# Patient Record
Sex: MALE | Race: WHITE
[De-identification: names, ages, dates, MRNs, and addresses within clinical notes are randomized per-mention and may not be internally consistent; named-entity substitution may affect disease eponyms.]

---

## 2018-02-14 ENCOUNTER — HOSPITAL ENCOUNTER (EMERGENCY)
Dept: HOSPITAL 35 - ER | Age: 38
Discharge: HOME | End: 2018-02-14
Payer: COMMERCIAL

## 2018-02-14 VITALS — WEIGHT: 205.01 LBS | HEIGHT: 67 IN | BODY MASS INDEX: 32.18 KG/M2

## 2018-02-14 DIAGNOSIS — I10: ICD-10-CM

## 2018-02-14 DIAGNOSIS — E11.9: ICD-10-CM

## 2018-02-14 DIAGNOSIS — Z88.6: ICD-10-CM

## 2018-02-14 DIAGNOSIS — R07.9: ICD-10-CM

## 2018-02-14 DIAGNOSIS — Z88.0: ICD-10-CM

## 2018-02-14 DIAGNOSIS — R51: Primary | ICD-10-CM

## 2018-02-14 LAB
ALBUMIN SERPL-MCNC: 3.6 G/DL (ref 3.4–5)
ALT SERPL-CCNC: 20 U/L (ref 30–65)
ANION GAP SERPL CALC-SCNC: 9 MMOL/L (ref 7–16)
AST SERPL-CCNC: 16 U/L (ref 15–37)
BACTERIA #/AREA URNS HPF: (no result) /HPF
BASOPHILS NFR BLD AUTO: 1.2 % (ref 0–2)
BILIRUB SERPL-MCNC: 0.3 MG/DL
BILIRUB UR-MCNC: NEGATIVE MG/DL
BUN SERPL-MCNC: 14 MG/DL (ref 7–18)
CALCIUM SERPL-MCNC: 9.2 MG/DL (ref 8.5–10.1)
CHLORIDE SERPL-SCNC: 104 MMOL/L (ref 98–107)
CO2 SERPL-SCNC: 29 MMOL/L (ref 21–32)
COLOR UR: YELLOW
CREAT SERPL-MCNC: 1 MG/DL (ref 0.7–1.3)
EOSINOPHIL NFR BLD: 1.7 % (ref 0–3)
ERYTHROCYTE [DISTWIDTH] IN BLOOD BY AUTOMATED COUNT: 15.9 % (ref 10.5–14.5)
GLUCOSE SERPL-MCNC: 205 MG/DL (ref 74–106)
GRANULOCYTES NFR BLD MANUAL: 67.6 % (ref 36–66)
HCT VFR BLD CALC: 41.8 % (ref 42–52)
HGB BLD-MCNC: 13.8 GM/DL (ref 14–18)
KETONES UR STRIP-MCNC: NEGATIVE MG/DL
LYMPHOCYTES NFR BLD AUTO: 21.8 % (ref 24–44)
MCH RBC QN AUTO: 25 PG (ref 26–34)
MCHC RBC AUTO-ENTMCNC: 33.1 G/DL (ref 28–37)
MCV RBC: 75.6 FL (ref 80–100)
MONOCYTES NFR BLD: 7.7 % (ref 1–8)
MUCUS: (no result) STRN/LPF
NEUTROPHILS # BLD: 8.3 THOU/UL (ref 1.4–8.2)
NITRITE UR QL STRIP: NEGATIVE
PLATELET # BLD: 250 THOU/UL (ref 150–400)
POTASSIUM SERPL-SCNC: 4.1 MMOL/L (ref 3.5–5.1)
PROT SERPL-MCNC: 6.9 G/DL (ref 6.4–8.2)
RBC # BLD AUTO: 5.52 MIL/UL (ref 4.5–6)
RBC # UR STRIP: (no result) /UL
RBC #/AREA URNS HPF: (no result) /HPF (ref 0–2)
SODIUM SERPL-SCNC: 142 MMOL/L (ref 136–145)
SP GR UR STRIP: 1.02 (ref 1–1.03)
SQUAMOUS: (no result) /LPF (ref 0–3)
TROPONIN I SERPL-MCNC: < 0.04 NG/ML (ref ?–0.06)
URINE CLARITY: CLEAR
URINE GLUCOSE-RANDOM*: (no result)
URINE LEUKOCYTES: NEGATIVE
URINE PROTEIN (DIPSTICK): (no result)
UROBILINOGEN UR STRIP-ACNC: 0.2 E.U./DL (ref 0.2–1)
WBC # BLD AUTO: 12.3 THOU/UL (ref 4–11)
WBC #/AREA URNS HPF: (no result) /HPF (ref 0–5)

## 2019-07-08 ENCOUNTER — HOSPITAL ENCOUNTER (EMERGENCY)
Dept: HOSPITAL 35 - ER | Age: 39
LOS: 1 days | Discharge: HOME | End: 2019-07-09
Payer: COMMERCIAL

## 2019-07-08 VITALS — WEIGHT: 205.01 LBS | BODY MASS INDEX: 30.36 KG/M2 | HEIGHT: 69 IN

## 2019-07-08 DIAGNOSIS — R53.1: Primary | ICD-10-CM

## 2019-07-08 DIAGNOSIS — I10: ICD-10-CM

## 2019-07-08 DIAGNOSIS — F17.210: ICD-10-CM

## 2019-07-08 DIAGNOSIS — R21: ICD-10-CM

## 2019-07-08 DIAGNOSIS — E11.9: ICD-10-CM

## 2019-07-08 DIAGNOSIS — Z88.0: ICD-10-CM

## 2019-07-08 DIAGNOSIS — Z88.6: ICD-10-CM

## 2019-07-08 LAB
ALBUMIN SERPL-MCNC: 3.4 G/DL (ref 3.4–5)
ALT SERPL-CCNC: 18 U/L (ref 30–65)
ANION GAP SERPL CALC-SCNC: 11 MMOL/L (ref 7–16)
AST SERPL-CCNC: 12 U/L (ref 15–37)
BASOPHILS NFR BLD AUTO: 1.2 % (ref 0–2)
BILIRUB SERPL-MCNC: 0.2 MG/DL
BUN SERPL-MCNC: 19 MG/DL (ref 7–18)
CALCIUM SERPL-MCNC: 8.4 MG/DL (ref 8.5–10.1)
CHLORIDE SERPL-SCNC: 103 MMOL/L (ref 98–107)
CO2 SERPL-SCNC: 29 MMOL/L (ref 21–32)
CREAT SERPL-MCNC: 1.7 MG/DL (ref 0.7–1.3)
EOSINOPHIL NFR BLD: 2 % (ref 0–3)
ERYTHROCYTE [DISTWIDTH] IN BLOOD BY AUTOMATED COUNT: 16.6 % (ref 10.5–14.5)
GLUCOSE SERPL-MCNC: 182 MG/DL (ref 74–106)
GRANULOCYTES NFR BLD MANUAL: 60.6 % (ref 36–66)
HCT VFR BLD CALC: 36.3 % (ref 42–52)
HGB BLD-MCNC: 11.8 GM/DL (ref 14–18)
LYMPHOCYTES NFR BLD AUTO: 28.3 % (ref 24–44)
MAGNESIUM SERPL-MCNC: 1.9 MG/DL (ref 1.8–2.4)
MCH RBC QN AUTO: 25 PG (ref 26–34)
MCHC RBC AUTO-ENTMCNC: 32.6 G/DL (ref 28–37)
MCV RBC: 76.7 FL (ref 80–100)
MONOCYTES NFR BLD: 7.9 % (ref 1–8)
NEUTROPHILS # BLD: 6.9 THOU/UL (ref 1.4–8.2)
PLATELET # BLD: 218 THOU/UL (ref 150–400)
POTASSIUM SERPL-SCNC: 3.9 MMOL/L (ref 3.5–5.1)
PROT SERPL-MCNC: 6.8 G/DL (ref 6.4–8.2)
RBC # BLD AUTO: 4.74 MIL/UL (ref 4.5–6)
SODIUM SERPL-SCNC: 143 MMOL/L (ref 136–145)
TROPONIN I SERPL-MCNC: <0.06 NG/ML (ref ?–0.06)
WBC # BLD AUTO: 11.4 THOU/UL (ref 4–11)

## 2019-07-09 VITALS — DIASTOLIC BLOOD PRESSURE: 85 MMHG | SYSTOLIC BLOOD PRESSURE: 122 MMHG

## 2019-07-09 LAB
BILIRUB UR-MCNC: NEGATIVE MG/DL
COLOR UR: YELLOW
KETONES UR STRIP-MCNC: NEGATIVE MG/DL
RBC # UR STRIP: (no result) /UL
SP GR UR STRIP: <= 1.005 (ref 1–1.03)
URINE CLARITY: CLEAR
URINE GLUCOSE-RANDOM*: (no result)
URINE LEUKOCYTES-REFLEX: NEGATIVE
URINE NITRITE-REFLEX: NEGATIVE
URINE PROTEIN (DIPSTICK): (no result)
UROBILINOGEN UR STRIP-ACNC: 0.2 E.U./DL (ref 0.2–1)

## 2019-12-30 ENCOUNTER — HOSPITAL ENCOUNTER (EMERGENCY)
Dept: HOSPITAL 35 - ER | Age: 39
Discharge: HOME | End: 2019-12-30
Payer: COMMERCIAL

## 2019-12-30 VITALS — DIASTOLIC BLOOD PRESSURE: 84 MMHG | SYSTOLIC BLOOD PRESSURE: 126 MMHG

## 2019-12-30 VITALS — BODY MASS INDEX: 31.39 KG/M2 | HEIGHT: 67 IN | WEIGHT: 200 LBS

## 2019-12-30 DIAGNOSIS — Z88.6: ICD-10-CM

## 2019-12-30 DIAGNOSIS — R53.1: ICD-10-CM

## 2019-12-30 DIAGNOSIS — I10: ICD-10-CM

## 2019-12-30 DIAGNOSIS — F17.210: ICD-10-CM

## 2019-12-30 DIAGNOSIS — Z88.0: ICD-10-CM

## 2019-12-30 DIAGNOSIS — E11.9: ICD-10-CM

## 2019-12-30 DIAGNOSIS — R11.0: Primary | ICD-10-CM

## 2019-12-30 LAB
ALBUMIN SERPL-MCNC: 3.9 G/DL (ref 3.4–5)
ALT SERPL-CCNC: 30 U/L (ref 30–65)
AMYLASE SERPL-CCNC: 93 U/L (ref 25–115)
ANION GAP SERPL CALC-SCNC: 8 MMOL/L (ref 7–16)
AST SERPL-CCNC: 20 U/L (ref 15–37)
BACTERIA-REFLEX: (no result) /HPF
BASOPHILS NFR BLD AUTO: 1.3 % (ref 0–2)
BILIRUB DIRECT SERPL-MCNC: < 0.1 MG/DL
BILIRUB SERPL-MCNC: 0.3 MG/DL
BILIRUB UR-MCNC: NEGATIVE MG/DL
BUN SERPL-MCNC: 20 MG/DL (ref 7–18)
CALCIUM SERPL-MCNC: 8.7 MG/DL (ref 8.5–10.1)
CHLORIDE SERPL-SCNC: 107 MMOL/L (ref 98–107)
CO2 SERPL-SCNC: 28 MMOL/L (ref 21–32)
COLOR UR: YELLOW
CREAT SERPL-MCNC: 1.6 MG/DL (ref 0.7–1.3)
EOSINOPHIL NFR BLD: 1.9 % (ref 0–3)
ERYTHROCYTE [DISTWIDTH] IN BLOOD BY AUTOMATED COUNT: 16.6 % (ref 10.5–14.5)
GLUCOSE SERPL-MCNC: 137 MG/DL (ref 74–106)
GRANULOCYTES NFR BLD MANUAL: 69.4 % (ref 36–66)
HCT VFR BLD CALC: 38.2 % (ref 42–52)
HGB BLD-MCNC: 12.2 GM/DL (ref 14–18)
KETONES UR STRIP-MCNC: NEGATIVE MG/DL
LIPASE: 174 U/L (ref 73–393)
LYMPHOCYTES NFR BLD AUTO: 19.6 % (ref 24–44)
MCH RBC QN AUTO: 25.3 PG (ref 26–34)
MCHC RBC AUTO-ENTMCNC: 32 G/DL (ref 28–37)
MCV RBC: 79.1 FL (ref 80–100)
MONOCYTES NFR BLD: 7.8 % (ref 1–8)
NEUTROPHILS # BLD: 9.4 THOU/UL (ref 1.4–8.2)
PLATELET # BLD: 219 THOU/UL (ref 150–400)
POTASSIUM SERPL-SCNC: 4.1 MMOL/L (ref 3.5–5.1)
PROT SERPL-MCNC: 7.1 G/DL (ref 6.4–8.2)
RBC # BLD AUTO: 4.82 MIL/UL (ref 4.5–6)
RBC # UR STRIP: (no result) /UL
SODIUM SERPL-SCNC: 143 MMOL/L (ref 136–145)
SP GR UR STRIP: 1.02 (ref 1–1.03)
SQUAMOUS: (no result) /LPF (ref 0–3)
URINE CLARITY: CLEAR
URINE GLUCOSE-RANDOM*: (no result)
URINE LEUKOCYTES-REFLEX: NEGATIVE
URINE NITRITE-REFLEX: NEGATIVE
URINE PROTEIN (DIPSTICK): (no result)
UROBILINOGEN UR STRIP-ACNC: 0.2 E.U./DL (ref 0.2–1)
WBC # BLD AUTO: 13.5 THOU/UL (ref 4–11)

## 2020-06-03 ENCOUNTER — HOSPITAL ENCOUNTER (INPATIENT)
Dept: HOSPITAL 35 - ER | Age: 40
LOS: 1 days | Discharge: HOME | DRG: 313 | End: 2020-06-04
Attending: HOSPITALIST | Admitting: HOSPITALIST
Payer: COMMERCIAL

## 2020-06-03 VITALS — SYSTOLIC BLOOD PRESSURE: 129 MMHG | DIASTOLIC BLOOD PRESSURE: 78 MMHG

## 2020-06-03 VITALS — HEIGHT: 62.01 IN | BODY MASS INDEX: 39.97 KG/M2 | WEIGHT: 220 LBS

## 2020-06-03 VITALS — SYSTOLIC BLOOD PRESSURE: 107 MMHG | DIASTOLIC BLOOD PRESSURE: 70 MMHG

## 2020-06-03 VITALS — DIASTOLIC BLOOD PRESSURE: 83 MMHG | SYSTOLIC BLOOD PRESSURE: 119 MMHG

## 2020-06-03 VITALS — SYSTOLIC BLOOD PRESSURE: 103 MMHG | DIASTOLIC BLOOD PRESSURE: 55 MMHG

## 2020-06-03 VITALS — DIASTOLIC BLOOD PRESSURE: 90 MMHG | SYSTOLIC BLOOD PRESSURE: 132 MMHG

## 2020-06-03 DIAGNOSIS — E11.22: ICD-10-CM

## 2020-06-03 DIAGNOSIS — E78.5: ICD-10-CM

## 2020-06-03 DIAGNOSIS — Z79.899: ICD-10-CM

## 2020-06-03 DIAGNOSIS — Z88.6: ICD-10-CM

## 2020-06-03 DIAGNOSIS — N18.3: ICD-10-CM

## 2020-06-03 DIAGNOSIS — F12.90: ICD-10-CM

## 2020-06-03 DIAGNOSIS — R07.9: Primary | ICD-10-CM

## 2020-06-03 DIAGNOSIS — Z98.41: ICD-10-CM

## 2020-06-03 DIAGNOSIS — Z79.82: ICD-10-CM

## 2020-06-03 DIAGNOSIS — H54.62: ICD-10-CM

## 2020-06-03 DIAGNOSIS — Z88.0: ICD-10-CM

## 2020-06-03 DIAGNOSIS — I12.9: ICD-10-CM

## 2020-06-03 DIAGNOSIS — E11.42: ICD-10-CM

## 2020-06-03 DIAGNOSIS — F17.210: ICD-10-CM

## 2020-06-03 DIAGNOSIS — Z86.73: ICD-10-CM

## 2020-06-03 DIAGNOSIS — Z71.6: ICD-10-CM

## 2020-06-03 LAB
ALBUMIN SERPL-MCNC: 3.6 G/DL (ref 3.4–5)
ALT SERPL-CCNC: 26 U/L (ref 30–65)
ANION GAP SERPL CALC-SCNC: 7 MMOL/L (ref 7–16)
AST SERPL-CCNC: 19 U/L (ref 15–37)
BASOPHILS NFR BLD AUTO: 0.1 % (ref 0–2)
BILIRUB SERPL-MCNC: 0.3 MG/DL (ref 0.2–1)
BUN SERPL-MCNC: 22 MG/DL (ref 7–18)
CALCIUM SERPL-MCNC: 8.1 MG/DL (ref 8.5–10.1)
CHLORIDE SERPL-SCNC: 105 MMOL/L (ref 98–107)
CHOLEST SERPL-MCNC: 95 MG/DL (ref ?–200)
CO2 SERPL-SCNC: 29 MMOL/L (ref 21–32)
CREAT SERPL-MCNC: 1.8 MG/DL (ref 0.7–1.3)
EOSINOPHIL NFR BLD: 2 % (ref 0–3)
ERYTHROCYTE [DISTWIDTH] IN BLOOD BY AUTOMATED COUNT: 16.8 % (ref 10.5–14.5)
GLUCOSE SERPL-MCNC: 143 MG/DL (ref 74–106)
GRANULOCYTES NFR BLD MANUAL: 61.8 % (ref 36–66)
HCT VFR BLD CALC: 36.9 % (ref 42–52)
HDLC SERPL-MCNC: 27 MG/DL (ref 40–?)
HGB BLD-MCNC: 12 GM/DL (ref 14–18)
LDLC SERPL-MCNC: 38 MG/DL (ref ?–100)
LYMPHOCYTES NFR BLD AUTO: 28.2 % (ref 24–44)
MCH RBC QN AUTO: 25.2 PG (ref 26–34)
MCHC RBC AUTO-ENTMCNC: 32.4 G/DL (ref 28–37)
MCV RBC: 77.7 FL (ref 80–100)
MONOCYTES NFR BLD: 7.9 % (ref 1–8)
NEUTROPHILS # BLD: 6.9 THOU/UL (ref 1.4–8.2)
PLATELET # BLD: 210 THOU/UL (ref 150–400)
POTASSIUM SERPL-SCNC: 3.8 MMOL/L (ref 3.5–5.1)
PROT SERPL-MCNC: 6.8 G/DL (ref 6.4–8.2)
RBC # BLD AUTO: 4.75 MIL/UL (ref 4.5–6)
SODIUM SERPL-SCNC: 141 MMOL/L (ref 136–145)
TC:HDL: 3.5 RATIO
TRIGL SERPL-MCNC: 152 MG/DL (ref ?–150)
TROPONIN I SERPL-MCNC: <0.06 NG/ML (ref ?–0.06)
VLDLC SERPL CALC-MCNC: 30 MG/DL (ref ?–40)
WBC # BLD AUTO: 11.2 THOU/UL (ref 4–11)

## 2020-06-03 PROCEDURE — 10081 I&D PILONIDAL CYST COMP: CPT

## 2020-06-04 VITALS — SYSTOLIC BLOOD PRESSURE: 103 MMHG | DIASTOLIC BLOOD PRESSURE: 80 MMHG

## 2020-06-04 VITALS — DIASTOLIC BLOOD PRESSURE: 79 MMHG | SYSTOLIC BLOOD PRESSURE: 138 MMHG

## 2020-06-04 VITALS — DIASTOLIC BLOOD PRESSURE: 72 MMHG | SYSTOLIC BLOOD PRESSURE: 115 MMHG

## 2020-06-04 VITALS — SYSTOLIC BLOOD PRESSURE: 121 MMHG | DIASTOLIC BLOOD PRESSURE: 73 MMHG

## 2020-06-04 VITALS — DIASTOLIC BLOOD PRESSURE: 73 MMHG | SYSTOLIC BLOOD PRESSURE: 121 MMHG

## 2020-06-04 VITALS — DIASTOLIC BLOOD PRESSURE: 97 MMHG | SYSTOLIC BLOOD PRESSURE: 140 MMHG

## 2020-06-04 VITALS — DIASTOLIC BLOOD PRESSURE: 74 MMHG | SYSTOLIC BLOOD PRESSURE: 114 MMHG

## 2020-06-04 LAB
ANION GAP SERPL CALC-SCNC: 9 MMOL/L (ref 7–16)
BUN SERPL-MCNC: 20 MG/DL (ref 7–18)
CALCIUM SERPL-MCNC: 7.8 MG/DL (ref 8.5–10.1)
CHLORIDE SERPL-SCNC: 107 MMOL/L (ref 98–107)
CO2 SERPL-SCNC: 27 MMOL/L (ref 21–32)
CREAT SERPL-MCNC: 1.5 MG/DL (ref 0.7–1.3)
ERYTHROCYTE [DISTWIDTH] IN BLOOD BY AUTOMATED COUNT: 16.9 % (ref 10.5–14.5)
GLUCOSE SERPL-MCNC: 128 MG/DL (ref 74–106)
HCT VFR BLD CALC: 35.2 % (ref 42–52)
HGB BLD-MCNC: 11.7 GM/DL (ref 14–18)
MCH RBC QN AUTO: 25.7 PG (ref 26–34)
MCHC RBC AUTO-ENTMCNC: 33.3 G/DL (ref 28–37)
MCV RBC: 77.1 FL (ref 80–100)
PLATELET # BLD: 196 THOU/UL (ref 150–400)
POTASSIUM SERPL-SCNC: 4 MMOL/L (ref 3.5–5.1)
RBC # BLD AUTO: 4.56 MIL/UL (ref 4.5–6)
SODIUM SERPL-SCNC: 143 MMOL/L (ref 136–145)
WBC # BLD AUTO: 11.2 THOU/UL (ref 4–11)

## 2020-06-04 NOTE — EKG
[facility]
[address]
[place]   [address]                     ELECTROCARDIOGRAM REPORT      
_______________________________________________________________________________
 
Name:       MINESH TALBOT               Room #:         200-I       ADM IN  
M.R.#:      [id number]                       Account #:      [id number]  
Admission:  20    Attend Phys:    Eben Georges MD     
Discharge:              Date of Birth:  [birth year]  
                                                          Report #: [id number]
                                                                    [id number]
_______________________________________________________________________________
THIS REPORT FOR:  
 
cc:  Andrew Mora Brady DO Lundgren,Douglas CORMIER MD [facility]                                        ~
THIS REPORT FOR:   //name//                          
 
                          [facility]
                                       
Test Date:    2020               Test Time:    07:09:13
Pat Name:     MINESH TALBOT            Department:   
Patient ID:   [id number]            Room:         200 I
Gender:       M                        Technician:   MURRAY
:          1980               Requested By: Ivelisse Nowak
Order Number: [id number] MD:   Douglas Liao
                                 Measurements
Intervals                              Axis          
Rate:         78                       P:            50
FL:           156                      QRS:          38
QRSD:         98                       T:            24
QT:           361                                    
QTc:          412                                    
                           Interpretive Statements
Sinus rhythm
Normal tracing
Compared to ECG 2019 21:19:08
No significant changes
 
Electronically Signed On 2020 8:46:38 CDT by Douglas Liao
[email or web address]
 
 
 
 
 
 
 
 
 
 
 
 
 
 
  <ELECTRONICALLY SIGNED>
   By: Douglas Liao MD, [facility]   
  20     0846
D: 20                           _____________________________________
T: 20                           Douglas Liao MD, [facility]     /EPI

## 2020-06-04 NOTE — 2DMMODE
[facility]
[place]   [address]                   2 D/M-MODE ECHOCARDIOGRAM     
_______________________________________________________________________________
 
Name:       MINESH TALBOT               Room #:         200-I       ADM IN  
M.R.#:      [id number]                       Account #:      [id number]  
Admission:  20    Attend Phys:    Eben Georges MD     
Discharge:              Date of Birth:  [birth year]  
                                                          Report #: [id number]
                                                                    [id number]
_______________________________________________________________________________
THIS REPORT FOR:  
 
cc:  Andrew Mora Brady DO Park,Oliverio MARTINEZ MD                                                   
                                                                       ~
 
--------------- APPROVED REPORT --------------
 
 
Study performed:  2020 13:45:49
 
EXAM: Comprehensive 2D, Doppler, and color-flow 
Echocardiogram 
Patient Location: Echo lab   
Room #:  200     Status:  routine
 
      BSA:         1.99
HR: 74 bpm BP:          138/79 mmHg 
Rhythm: NSR     
 
Other Information 
Study Quality: Good
 
Indications
Diabetes
Chest Pain
Hypertension/HDD
 
2D Dimensions
RVDd:  35.20 mm  
IVSd:  10.39 (7-11mm) LVOT Diam:  23.48 (18-24mm) 
LVDd:  47.03 mm  
PWd:  11.43 (7-11mm) Ascending Ao:  29.41 (22-36mm)
LVDs:  29.13 (25-40mm) 
Aortic Root:  33.40 mm IVC:  19.00 mm
 
Volumes
Left Atrial Volume (Systole) 
Single Plane 4CH:  45.65 mL Single Plane 2CH:  39.99 mL
    LA ESV Index:  24.00 mL/m2
 
Aortic Valve
AoV Peak Heriberto.:  1.14 m/s 
AO Peak Gr.:  5.23 mmHg  LVOT Max P.18 mmHg
    LVOT Max V:  1.02 m/s
 
 
[facility]
[address]
[place]  [address]
Phone:  [phone]                    2 D/M-MODE ECHOCARDIOGRAM     
_______________________________________________________________________________
 
Name:            MINESH TALBOT               Room #:        200-I       [facility] [place]
[facility]#:           [id number]          Account #:     [id number]  
Admission:       20         Attend Phys:   Eben Georges MD 
Discharge:                  Date of Birth: [birth year]  
                         Report #:      [id number]
        [id number]
_______________________________________________________________________________
RUDOLPH Vmax: 3.87 cm2  
 
Mitral Valve
    E/A Ratio:  1.9
    MV Decel. Time:  164.84 ms
MV E Max Heriberto.:  1.17 m/s 
MV A Heriberto.:  0.63 m/s  
MV PHT:  47.80 ms  
IVRT:  78.43 ms   
 
Pulmonary Valve
PV Peak Heriberto.:  1.15 m/s PV Peak Gr.:  5.34 mmHg
 
Pulmonary Vein
P Vein S:    0.39 m/s P Vein A:  0.19 m/s
P Vein D:   0.35 m/s P Vein A Dur.:  87.7 msec
P Vein S/D Ratio:  1.11 
 
Tricuspid Valve
TR Peak Heriberto.:  2.25 m/s  
TR Peak Gr.:  20.18 mmHg 
    PA Pressure:  25.00 mmHg
 
Left Ventricle
The left ventricle is normal size. There is normal LV segmental wall 
motion. There is normal left ventricular wall thickness. The left 
ventricular systolic function is normal. The left ventricular 
ejection fraction is within the normal range. LVEF is 55-60%.
 
Right Ventricle
The right ventricle is normal size. The right ventricular systolic 
function is normal.
 
Atria
The left atrium size is normal. The right atrium size is 
normal.
 
Aortic Valve
The aortic valve is normal in structure. No aortic regurgitation is 
present. There is no aortic valvular stenosis.
 
Mitral Valve
The mitral valve is normal in structure. Mild mitral regurgitation. 
No evidence of mitral valve stenosis.
 
Tricuspid Valve
 
 
[facility]
[address]
[place]  [address]
Phone:  [phone]                    2 D/M-MODE ECHOCARDIOGRAM     
_______________________________________________________________________________
 
Name:            MINESH TALBOT               Room #:        200-I       [facility] [place]
[facility]#:           [id number]          Account #:     [id number]  
Admission:       20         Attend Phys:   Eben Georges MD 
Discharge:                  Date of Birth: [birth year]  
                         Report #:      [id number]
        [id number]
_______________________________________________________________________________
The tricuspid valve is normal in structure. There is trace tricuspid 
regurgitation. Estimated PAP 25 mmHg. There is no pulmonary 
hypertension.
 
Pulmonic Valve
The pulmonary valve is normal in structure. There is no pulmonic 
valvular regurgitation.
 
Great Vessels
The aortic root is normal in size. IVC is normal in size and 
collapses >50% with inspiration.
 
Pericardium
There is no pericardial effusion.
 
<Conclusion>
The left ventricle is normal size.
There is normal left ventricular wall thickness.
The left ventricular systolic function is normal.
The right ventricle is normal size.
The left atrium size is normal.
The aortic valve is normal in structure.
Mild mitral regurgitation.
There is trace tricuspid regurgitation. Estimated PAP 25 mmHg.
 
 
 
 
 
 
 
 
 
 
 
 
 
 
 
 
 
 
 
 
  <ELECTRONICALLY SIGNED>
   By: Oliverio Hernandez MD               
  20     1439
D: 20 1439                           _____________________________________
T: 20 1439                           Oliverio Hernandez MD                 /INF

## 2020-06-04 NOTE — NUR
PATIENT TRANSFERRED FROM ED TO CCU SHORTLY BEFORE 2100. ASSUMED CARE OF
PATIENT.  PATIENT SCORED AS A HIGH FALL RISK DUE TO RECENT FALL. PATIENT DOES
AMBULATE WITHOUT ASSISTANCE AND NAVIGATES WELL AROUND ROOM DESPITE VISUAL
DEFICITS. PATIENT COMPLAINED OF CHEST AND LEFT SHOULDER PAIN AND RATED IT 8/10
AT ADMISSION ASSESSMENT. PATIENT STATED THAT IT WAS AN IMPROVEMENT FROM WHEN
HE FIRST ARRIVED. AT MIDNIGHT ASSESSMENT PATIENT RATED PAIN A 4/10 AND WAS
RESTING COMFORTABLY HOWEVER AT LAST ASSESSMENT PATIENT RATED PAIN A 10+ IN
LEFT CHEST AND SHOULDER. UPON ASKING PATIENT WHICH SIDE HE LANDED ON WHEN HE
FELL AT HOME, PATIENT STATED THAT HE HAD LANDED ON HIS LEFT SIDE. ADMINISTERED
PRN MORPHINE AS ORDERED. WILL CONTINUE TO MONITOR.

## 2020-06-04 NOTE — EKG
[facility]
[address]
[place]   [address]                     ELECTROCARDIOGRAM REPORT      
_______________________________________________________________________________
 
Name:       MINESH TALBOT               Room #:         200-I       ADM IN  
M.R.#:      [id number]                       Account #:      [id number]  
Admission:  20    Attend Phys:    Eben Georges MD     
Discharge:              Date of Birth:  [birth year]  
                                                          Report #: [id number]
                                                                    [id number]
_______________________________________________________________________________
THIS REPORT FOR:  
 
cc:  Andrew Mora Brady DO Lundgren,Douglas CORMIER MD [facility]
THIS REPORT FOR:   //name//                          
 
                         [facility] ED
                                       
Test Date:    2020               Test Time:    19:01:13
Pat Name:     MINESH TALBOT            Department:   
Patient ID:   [id number]            Room:         [facility]
Gender:       M                        Technician:   TAMY
:          1980               Requested By: Patricia Staton
Order Number: [id number] MD:   Douglas Liao
                                 Measurements
Intervals                              Axis          
Rate:         90                       P:            34
IA:           159                      QRS:          41
QRSD:         79                       T:            28
QT:           329                                    
QTc:          403                                    
                           Interpretive Statements
Sinus rhythm
Normal tracing
Compared to ECG 2019 21:19:08
No significant changes
 
Electronically Signed On 2020 8:42:21 CDT by Douglas Liao
[email or web address]
 
 
 
 
 
 
 
 
 
 
 
 
 
 
  <ELECTRONICALLY SIGNED>
   By: Douglas Liao MD, FACC   
  20     0842
D: 20 1901                           _____________________________________
T: 20 190                           Douglas Liao MD, [facility]     /EPI

## 2020-06-04 NOTE — NUR
ASSUMED CARE PT SHIFT PIERRE.E ASSESSMENTS AS CHARTED.MEDS GIVEN PER MAR. C/O
PAIN- MANAGED WITH IV PAIN MEDS. O2 SATS WNL ON ROOM AIR. STRESS TEST THIS
SHIFT. NON ISCHEMIC PER CARDIOLOGY. DR DELONG INFORMED. DC ORDERS AKNOWLEDGED
AND IMPLEMENTED. PAPERWORK DISCUSSED WITH PT, COMMUNICATES UNDERSTANDING. PT
LEFT WITH ALL BELONGINGS. IV REMOVED. TELE REMOVED.

## 2020-06-05 LAB
EST. AVERAGE GLUCOSE BLD GHB EST-MCNC: 166 MG/DL
GLYCOHEMOGLOBIN (HGB A1C): 7.4 % (ref 4.8–5.6)

## 2020-10-08 ENCOUNTER — HOSPITAL ENCOUNTER (INPATIENT)
Dept: HOSPITAL 35 - ER | Age: 40
LOS: 1 days | Discharge: HOME | DRG: 566 | End: 2020-10-09
Attending: HOSPITALIST | Admitting: HOSPITALIST
Payer: COMMERCIAL

## 2020-10-08 VITALS — DIASTOLIC BLOOD PRESSURE: 71 MMHG | SYSTOLIC BLOOD PRESSURE: 126 MMHG

## 2020-10-08 VITALS — DIASTOLIC BLOOD PRESSURE: 84 MMHG | SYSTOLIC BLOOD PRESSURE: 139 MMHG

## 2020-10-08 VITALS — HEIGHT: 65.98 IN | BODY MASS INDEX: 33.59 KG/M2 | WEIGHT: 209 LBS

## 2020-10-08 DIAGNOSIS — F17.210: ICD-10-CM

## 2020-10-08 DIAGNOSIS — E11.42: ICD-10-CM
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
## 2020-10-08 DIAGNOSIS — Z71.6: ICD-10-CM

## 2020-10-08 DIAGNOSIS — Z88.0: ICD-10-CM
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
## 2020-10-08 DIAGNOSIS — S46.022A: Primary | ICD-10-CM

## 2020-10-08 DIAGNOSIS — I10: ICD-10-CM

## 2020-10-08 DIAGNOSIS — F12.90: ICD-10-CM

## 2020-10-08 DIAGNOSIS — E78.5: ICD-10-CM

## 2020-10-08 DIAGNOSIS — Z81.1: ICD-10-CM

## 2020-10-08 DIAGNOSIS — Z98.41: ICD-10-CM

## 2020-10-08 DIAGNOSIS — Z82.49: ICD-10-CM

## 2020-10-08 DIAGNOSIS — Z88.6: ICD-10-CM

## 2020-10-08 DIAGNOSIS — G62.9: ICD-10-CM

## 2020-10-08 LAB
ALBUMIN SERPL-MCNC: 3.8 G/DL (ref 3.4–5)
ALT SERPL-CCNC: 26 U/L (ref 30–65)
ANION GAP SERPL CALC-SCNC: 13 MMOL/L (ref 7–16)
APTT BLD: 30.8 SECONDS (ref 24.5–32.8)
AST SERPL-CCNC: 27 U/L (ref 15–37)
BACTERIA-REFLEX: (no result) /HPF
BASOPHILS NFR BLD AUTO: 1.2 % (ref 0–2)
BILIRUB DIRECT SERPL-MCNC: < 0.1 MG/DL
BILIRUB SERPL-MCNC: 0.4 MG/DL (ref 0.2–1)
BILIRUB UR-MCNC: NEGATIVE MG/DL
BUN SERPL-MCNC: 22 MG/DL (ref 7–18)
CALCIUM SERPL-MCNC: 8.9 MG/DL (ref 8.5–10.1)
CHLORIDE SERPL-SCNC: 107 MMOL/L (ref 98–107)
CO2 SERPL-SCNC: 21 MMOL/L (ref 21–32)
COLOR UR: YELLOW
CREAT SERPL-MCNC: 1.2 MG/DL (ref 0.7–1.3)
EOSINOPHIL NFR BLD: 1.6 % (ref 0–3)
ERYTHROCYTE [DISTWIDTH] IN BLOOD BY AUTOMATED COUNT: 17.1 % (ref 10.5–14.5)
GLUCOSE SERPL-MCNC: 106 MG/DL (ref 74–106)
GRANULOCYTES NFR BLD MANUAL: 66.1 % (ref 36–66)
HCT VFR BLD CALC: 40.5 % (ref 42–52)
HGB BLD-MCNC: 13.1 GM/DL (ref 14–18)
INR PPP: 1
KETONES UR STRIP-MCNC: NEGATIVE MG/DL
LYMPHOCYTES NFR BLD AUTO: 23.5 % (ref 24–44)
MAGNESIUM SERPL-MCNC: 1.9 MG/DL (ref 1.8–2.4)
MCH RBC QN AUTO: 24.9 PG (ref 26–34)
MCHC RBC AUTO-ENTMCNC: 32.3 G/DL (ref 28–37)
MCV RBC: 77.2 FL (ref 80–100)
MONOCYTES NFR BLD: 7.6 % (ref 1–8)
NEUTROPHILS # BLD: 8.9 THOU/UL (ref 1.4–8.2)
PLATELET # BLD: 200 THOU/UL (ref 150–400)
POTASSIUM SERPL-SCNC: 4.8 MMOL/L (ref 3.5–5.1)
PROT SERPL-MCNC: 6.9 G/DL (ref 6.4–8.2)
PROTHROMBIN TIME: 9.9 SECONDS (ref 9.3–11.4)
RBC # BLD AUTO: 5.24 MIL/UL (ref 4.5–6)
RBC # UR STRIP: (no result) /UL
RBC #/AREA URNS HPF: (no result) /HPF (ref 0–2)
SODIUM SERPL-SCNC: 141 MMOL/L (ref 136–145)
SP GR UR STRIP: 1.02 (ref 1–1.03)
TROPONIN I SERPL-MCNC: <0.06 NG/ML (ref ?–0.06)
URINE CLARITY: CLEAR
URINE GLUCOSE-RANDOM*: (no result)
URINE LEUKOCYTES-REFLEX: NEGATIVE
URINE NITRITE-REFLEX: NEGATIVE
URINE PROTEIN (DIPSTICK): (no result)
UROBILINOGEN UR STRIP-ACNC: 0.2 E.U./DL (ref 0.2–1)
WBC # BLD AUTO: 13.4 THOU/UL (ref 4–11)

## 2020-10-08 PROCEDURE — 10081 I&D PILONIDAL CYST COMP: CPT

## 2020-10-08 NOTE — HC
[facility]
[address]
[place]   [address]                     CONSULTATION                  
_______________________________________________________________________________
 
Name:       MINESH TALBOT               Room #:         205-P       ADM IN  
M.R.#:      [id number]                       Account #:      [id number]  
Admission:  10/08/20    Attend Phys:    Ronak Manjarrez MD      
Discharge:              Date of Birth:  [birth year]  
                                                          Report #: [id number]
                                                                    [id number]   
_______________________________________________________________________________
THIS REPORT FOR:  
 
cc:  Andrew Mora, Roney Gaming MD                                         ~
CC: Andrew Manjarrez
 
DATE OF SERVICE:  10/08/2020
 
 
HISTORY OF PRESENT ILLNESS:  This is a 40-year-old male patient who was seen by
me for very unusual symptoms.  It is very difficult to keep the patient on a
subject.  He starts saying multiple things, which are not very relevant.  He
said he is a diabetic.  He is legally blind.  He is having some pain on the left
side.  Initially, he said in the spine area, then he said he is weak on the left
side.  His history keeps changing.  I talked to Dr. Park, the Emergency Room
physician.  The patient also gave a history that he went to [facility] and he was having TIA at that time, he was having left-sided weakness. 
Dr. Park did a CT angiogram of the head and neck and that does not show any
abnormality, which can explain the patient's symptoms.  In fact, it is pretty
much unremarkable.  He also complained of some urinary problem, which is going
on for a long time.
 
REVIEW OF SYSTEMS:  Positive for diabetes.  He said he had TIA.  He is legally
blind.  He had a cataract surgery.  He has a history of hypertension.  He had
ear tubes put in.  This was his relevant 14-point review of system.  Review of
system is also positive for marijuana use.  He said he uses it legally.  I am
not sure what the indication is.
 
PAST MEDICAL HISTORY:  Positive for diabetes and neuropathy.
 
FAMILY HISTORY:  Noncontributory.
 
SOCIAL HISTORY:  He smokes cigarettes and marijuana.
 
PHYSICAL EXAMINATION:  When I went to see the patient.  He is sitting on the
chair because he says he cannot lie on the bed.  He is complaining of some
pretty unusual symptoms.  Some of them are in the neck and some of them are in
the shoulder.  His cranial nerve examination is positive for blindness. 
Neuromuscular examination, he says he does not have much sensation, he says it
is more on the left side as compared to the right side, also involving the face.
 Cardiorespiratory examinations appear noncontributory.  He does not appear to
have any edema.  He is a moderately built individual.  Blood pressure is 135/75,
respirations 20, pulse is 84.
 
 
 
 
[address]   [address]                     CONSULTATION                  
_______________________________________________________________________________
 
Name:       MINESH TALBOT               Room #:         [address] [place] [address] [place]  
M.R.#:      [id number]                       Account #:      [id number]  
Admission:  10/08/20    Attend Phys:    Ronak Manjarrez MD      
Discharge:              Date of Birth:  [birth year]  
                                                          Report #: [id number]
                                                                    [id number]   
_______________________________________________________________________________
LABORATORY DATA:  His white count is 13.4.  His CT angiogram was reviewed and it
is unremarkable.
 
IMPRESSION:  This patient's symptoms are very unusual.  I am not sure what the
etiology is, but we will get an MRI done tomorrow to make sure there is no
pathology there.  I discussed with him the indication, potential complication,
and alternatives of MRI with the patient.  He understands that.  He wants to
proceed with it.  We will go ahead and do it tomorrow.  We will ask Dr. Nguyen
to follow up this patient with you from tomorrow.
 
I spent more than 50 minutes of time taking care of this patient including a
long discussion with the patient, discussing everything, as well as multiple
discussions with the Emergency Room physicians.
 
 
 
 
 
 
 
 
 
 
 
 
 
 
 
 
 
 
 
 
 
 
 
 
 
 
 
 
 
 
 
                         
   By:                               
                   
D: 10/08/20 2113                           _____________________________________
T: 10/09/20 0113                           Roney Painter MD           /nt

## 2020-10-08 NOTE — EKG
[facility]
[place]   [address]                     ELECTROCARDIOGRAM REPORT      
_______________________________________________________________________________
 
Name:       MATIAS TALBOT               Room #:                     [facility]#:      [id number]                       Account #:      [id number]  
Admission:  10/08/20    Attend Phys:                          
Discharge:              Date of Birth:  [birth year]  
                                                          Report #: [id number]
                                                                    [id number]
_______________________________________________________________________________
THIS REPORT FOR:  
 
cc:  Andrew Mora Brady DO Santiago, Patrick MD [facility]                                         ~
THIS REPORT FOR:   //name//                          
 
                         [facility] ED
                                       
Test Date:    2020-10-08               Test Time:    15:47:39
Pat Name:     MATIAS TALBOT            Department:   
Patient ID:   [id number]            Room:          
Gender:       M                        Technician:   kf
:          1980               Requested By: Matias Wood
Order Number: [id number] MD:   Teddy Billingsley
                                 Measurements
Intervals                              Axis          
Rate:         77                       P:            42
GA:           147                      QRS:          22
QRSD:         91                       T:            15
QT:           353                                    
QTc:          400                                    
                           Interpretive Statements
Sinus rhythm
Compared to ECG 2020 07:09:13
No significant changes
Electronically Signed On 10-8-2020 16:30:18 CDT by Teddy Billingsley
[email or web address]
 
 
 
 
 
 
 
 
 
 
 
 
 
 
 
 
  <ELECTRONICALLY SIGNED>
   By: Teddy Billingsley MD, FACC    
  10/08/20     1630
D: 10/08/20 1547                           _____________________________________
T: 10/08/20 1547                           Teddy Billingsley MD, FACC      /EPI

## 2020-10-08 NOTE — NUR
pt arrived at the unit around 2215, pt is awake, alert and orientedx4, sr on
the monitor, c/o pain on the left shoulder, bs stable, admission assessment
done and as chanrted, educated on fall precautions, states understanding,
states taking medical marijuana, pt given a snack, eating in bed without
difficulty, no acute distress noted, will continue to monitor

## 2020-10-08 NOTE — EMS
[facility]
 [place]   [address]                     EMS Patient Care Report       
_______________________________________________________________________________
 
Name:       MINESH TALBOT               Room #:                     REG SHANTE KWAN#:      [id number]                       Account #:      [id number]  
Admission:  10/08/20    Attend Phys:                          
Discharge:              Date of Birth:  [birth year]  
                                                          Report #: [id number]
                                                                    [id number]
_______________________________________________________________________________
THIS REPORT FOR:   //name//                          
 
Report Transmitted: 10/08/2020 14:19
EMS Care Summary
[facility]
Incident [id number] @ 10/08/2020 12:53
 
Incident Location
[address] [place] [address]
 
Patient
MINESH TALBOT
Male, 40 Years
 1980
 
Patient Address
[address] [place] [id number]
 
Patient History
Hypertension (HTN),Hyperlipidemia,TIA,Chronic Kidney Disease,Type 2 Diabetes,
 
Patient Allergies
Penicillin allergy,Ibuprofen,
 
Patient Medications
Albuterol, Losartan, Amlodipine,
 
Chief Complaint
WEAKNESS
 
Disposition
Transported No Lights/[place]
 
Dispatch Reason
Stroke/CVA
 
Transported To
[facility]
 
Narrative
Upon arrival to the patient, the patient appeared to have no immediate life 
threats. The patient was noted to be CAOX4 with a GCS of 15/15. The patient was 
noted to be sitting upright in a chair in the lobby of the doctor's office. The 
 
 
 
[facility]
 [place]   [address]                     EMS Patient Care Report       
_______________________________________________________________________________
 
Name:       MINESH TALBOT               Room #:                     REG   
ANIYA#:      [id number]                       Account #:      [id number]  
Admission:  10/08/20    Attend Phys:                          
Discharge:              Date of Birth:  [birth year]  
                                                          Report #: [id number]
                                                                    [id number]
_______________________________________________________________________________
patient was with staff and FD personnel. 
 
The patient was noted to be holding a "seeing stick". The patient stated that 
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 The 
patient was at the ophthalmologist office for an appointment with his right 
eye. The patient started to complain of weakness with staff. Staff called 911. 
The patient was noted to have equal weakness with an equal smile and no deficit 
to his speech. The patient was noted to have had previous TIA's and one 
previous stroke, but has no deficits from those incidents. The patient wanted 
to go to [facility] ER for further evaluation. The patient's 
vitals that were obtained by FD personnel were obtained for EMS. The patient's 
12 lead acquisition revealed no acute changes. The patient was assisted up to 
the cot. The patient was able to walk a couple steps without assistance needed 
other than to guide. The patient was secured to the cot and moved to the 
ambulance. 
 
En route to the ER, vitals were obtained and monitored. The patient was 
transported with no problems and no other complaints. Report was called to the 
ER via radio. The patient was left in room ER 9 with report given to RNs. 
 
EMS returned to service.
 
END OF REPORT.
 
Initial Vitals
@PTAP: 84,SpO2: 99,MI Suspected: false
@13:09P: 86,SpO2: 98,MI Suspected: false
@PTAP: 99,R: 18,BP: 168/103,Pain: 0/10,GCS: 15,Glucose: 127,SpO2: 97,Revised 
Trauma: 12, 
@13:20P: 88,R: 17,BP: 143/89,Pain: 0/10,GCS: 15,SpO2: 97,Revised Trauma: 12,
 
Assessments
@13:10MENTAL:Time Oriented,Person Oriented,Event Oriented,Place 
Oriented,SKIN:HEENT:Eyes: Left: Dilated,Eyes: Right: Dilated,Eyes: Right: 
Blind,Eyes: Right: Non-Reactive,Eyes: Left: Non-Reactive,Eyes: Left: 
Blind,Head/Face: No Abnormalities,Neck/Airway: No Abnormalities,LUNG 
SOUNDS:General: No Abnormalities,ABDOMEN:General: No 
Abnormalities,PELVIS//GI:EXTREMITIES:Left Arm: Weakness,Left Leg: 
Weakness,Right Leg: Weakness,Right Arm: Weakness,PULSE:Radial: 2+ 
Normal,NEURO:No Abnormalities, 
 
Impression
Generalized Weakness
 
Procedures
@PTA12-Lead ECGResponse: UnchangedSucceeded@13:08ALS AssessmentResponse: 
 
 
 
[place] [address]                     EMS Patient Care Report       
_______________________________________________________________________________
 
Name:       MINESH TALBOT               Room #:                     REG ER  
[facility].#:      [id number]                       Account #:      [id number]  
Admission:  10/08/20    Attend Phys:                          
Discharge:              Date of Birth:  [birth year]  
                                                          Report #: [id number]
                                                                    [id number]
_______________________________________________________________________________
UnchangedSucceeded@13:10StretcherResponse: Unchanged@PTA3-Lead ECGResponse: 
UnchangedSucceeded 
 
Timeline
PTA,12-Lead ECG,Response: UnchangedSucceeded,
PTA,3-Lead ECG,Response: UnchangedSucceeded,
PTA,BP: / M,PULSE: 84,RR:  R,SPO2: 99 Ox,ETCO2:  ,BG: ,PAIN: ,GCS: ,
PTA,BP: 168/103 M,PULSE: 99,RR: 18 R,SPO2: 97 Ox,ETCO2:  ,B,PAIN: 0,GCS: 
15, 
12:50,Call Received
12:50,Psap Call
12:53,Dispatched
12:54,En Route
13:01,On Scene
13:04,At Patient
13:08,ALS Assessment,Response: UnchangedSucceeded,
13:09,BP: / M,PULSE: 86,RR:  R,SPO2: 98 Ox,ETCO2:  ,BG: ,PAIN: ,GCS: ,
13:10,Stretcher,Response: Unchanged
13:18,Depart Scene
13:20,BP: 143/89 M,PULSE: 88,RR: 17 R,SPO2: 97 Ox,ETCO2:  ,BG: ,PAIN: 0,GCS: 15,
13:29,At Destination
13:50,Call Closed
 
Disclaimer
v1.1     Copyright 2020 [employer]
This EMS Care Summary contains data elements from the applicable legal record 
(which may be displayed differently). It is designed to provide pertinent 
information for the following purposes: continuity of care, clinical quality, 
and state data reporting. The complete legal record is available to ED staff 
and administrators of the receiving hospital in [employer] Patient Tracker. All data 
is provided "as is."

## 2020-10-09 VITALS — DIASTOLIC BLOOD PRESSURE: 91 MMHG | SYSTOLIC BLOOD PRESSURE: 138 MMHG

## 2020-10-09 VITALS — SYSTOLIC BLOOD PRESSURE: 135 MMHG | DIASTOLIC BLOOD PRESSURE: 90 MMHG

## 2020-10-09 VITALS — SYSTOLIC BLOOD PRESSURE: 138 MMHG | DIASTOLIC BLOOD PRESSURE: 91 MMHG

## 2020-10-09 VITALS — DIASTOLIC BLOOD PRESSURE: 71 MMHG | SYSTOLIC BLOOD PRESSURE: 118 MMHG

## 2020-10-09 LAB
ANION GAP SERPL CALC-SCNC: 12 MMOL/L (ref 7–16)
BUN SERPL-MCNC: 20 MG/DL (ref 7–18)
CALCIUM SERPL-MCNC: 8.7 MG/DL (ref 8.5–10.1)
CHLORIDE SERPL-SCNC: 105 MMOL/L (ref 98–107)
CO2 SERPL-SCNC: 24 MMOL/L (ref 21–32)
CREAT SERPL-MCNC: 1.5 MG/DL (ref 0.7–1.3)
ERYTHROCYTE [DISTWIDTH] IN BLOOD BY AUTOMATED COUNT: 16.9 % (ref 10.5–14.5)
GLUCOSE SERPL-MCNC: 115 MG/DL (ref 74–106)
HCT VFR BLD CALC: 38.7 % (ref 42–52)
HGB BLD-MCNC: 12.6 GM/DL (ref 14–18)
MCH RBC QN AUTO: 25.1 PG (ref 26–34)
MCHC RBC AUTO-ENTMCNC: 32.6 G/DL (ref 28–37)
MCV RBC: 77.1 FL (ref 80–100)
PLATELET # BLD: 196 THOU/UL (ref 150–400)
POTASSIUM SERPL-SCNC: 4 MMOL/L (ref 3.5–5.1)
RBC # BLD AUTO: 5.01 MIL/UL (ref 4.5–6)
SODIUM SERPL-SCNC: 141 MMOL/L (ref 136–145)
WBC # BLD AUTO: 10.4 THOU/UL (ref 4–11)

## 2020-10-09 NOTE — NUR
PT CARE ASSUMED AT 0700. ASSESSMENTS AS CHARTED. MEDICATION AS CHARTED. PT IS
LEGALLY BLIND. PERIPHERAL NEUROPATHY. USES MEDICAL MARIJUANA. STATES THAT HE
WILL RECIEVE A PUBIC CATHETER IN DECEMBER. AO X 4. LAC IV. SBA. PT COMPLAINED
OF LT SHOULDER PAIN; DR YOUNG. PT RECIEVED 2 MRI'S. PT TO BE DISCHARGED HOME.
PAPERWORK SIGNED. TELEMETRY D/C'D. IV D/C'D.

## 2021-01-06 ENCOUNTER — HOSPITAL ENCOUNTER (EMERGENCY)
Dept: HOSPITAL 35 - ER | Age: 41
Discharge: HOME | End: 2021-01-06
Payer: COMMERCIAL

## 2021-01-06 VITALS — BODY MASS INDEX: 36.32 KG/M2 | WEIGHT: 205.01 LBS | HEIGHT: 63 IN

## 2021-01-06 VITALS — DIASTOLIC BLOOD PRESSURE: 80 MMHG | SYSTOLIC BLOOD PRESSURE: 115 MMHG

## 2021-01-06 DIAGNOSIS — Z96.22: ICD-10-CM

## 2021-01-06 DIAGNOSIS — Z88.8: ICD-10-CM

## 2021-01-06 DIAGNOSIS — M54.2: ICD-10-CM

## 2021-01-06 DIAGNOSIS — Z98.890: ICD-10-CM

## 2021-01-06 DIAGNOSIS — M54.5: ICD-10-CM
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
## 2021-01-06 DIAGNOSIS — E11.42: ICD-10-CM

## 2021-01-06 DIAGNOSIS — H54.8: ICD-10-CM

## 2021-01-06 DIAGNOSIS — I10: ICD-10-CM

## 2021-01-06 DIAGNOSIS — M54.6: ICD-10-CM
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
## 2021-01-06 DIAGNOSIS — Z88.5: ICD-10-CM

## 2021-01-06 DIAGNOSIS — Z79.899: ICD-10-CM

## 2021-01-06 DIAGNOSIS — S09.90XA: Primary | ICD-10-CM
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
## 2021-01-06 DIAGNOSIS — Z88.0: ICD-10-CM

## 2021-01-06 DIAGNOSIS — E78.5: ICD-10-CM

## 2021-01-06 DIAGNOSIS — Z86.73: ICD-10-CM
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
## 2021-01-06 DIAGNOSIS — F17.210: ICD-10-CM

## 2021-01-06 DIAGNOSIS — M25.512: ICD-10-CM

## 2021-01-07 ENCOUNTER — HOSPITAL ENCOUNTER (EMERGENCY)
Dept: HOSPITAL 35 - ER | Age: 41
Discharge: HOME | End: 2021-01-07
Payer: COMMERCIAL

## 2021-01-07 VITALS — WEIGHT: 205.01 LBS | BODY MASS INDEX: 32.95 KG/M2 | HEIGHT: 66 IN

## 2021-01-07 VITALS — SYSTOLIC BLOOD PRESSURE: 140 MMHG | DIASTOLIC BLOOD PRESSURE: 71 MMHG

## 2021-01-07 DIAGNOSIS — F17.210: ICD-10-CM

## 2021-01-07 DIAGNOSIS — R07.89: Primary | ICD-10-CM

## 2021-01-07 DIAGNOSIS — Z79.899: ICD-10-CM

## 2021-01-07 DIAGNOSIS — M25.512: ICD-10-CM

## 2021-01-07 DIAGNOSIS — E78.5: ICD-10-CM

## 2021-01-07 DIAGNOSIS — I10: ICD-10-CM

## 2021-01-07 DIAGNOSIS — Z86.73: ICD-10-CM

## 2021-01-07 DIAGNOSIS — E11.9: ICD-10-CM

## 2021-01-07 DIAGNOSIS — Z88.0: ICD-10-CM

## 2021-01-07 DIAGNOSIS — M54.9: ICD-10-CM

## 2021-01-07 DIAGNOSIS — Z88.8: ICD-10-CM

## 2021-01-07 DIAGNOSIS — Z88.5: ICD-10-CM

## 2021-01-07 LAB
ALBUMIN SERPL-MCNC: 3.6 G/DL (ref 3.4–5)
ALT SERPL-CCNC: 24 U/L (ref 16–63)
ANION GAP SERPL CALC-SCNC: 7 MMOL/L (ref 7–16)
AST SERPL-CCNC: 15 U/L (ref 15–37)
BASOPHILS NFR BLD AUTO: 1.5 % (ref 0–2)
BILIRUB SERPL-MCNC: 0.4 MG/DL (ref 0.2–1)
BUN SERPL-MCNC: 26 MG/DL (ref 7–18)
CALCIUM SERPL-MCNC: 9.2 MG/DL (ref 8.5–10.1)
CHLORIDE SERPL-SCNC: 105 MMOL/L (ref 98–107)
CO2 SERPL-SCNC: 29 MMOL/L (ref 21–32)
CREAT SERPL-MCNC: 1.8 MG/DL (ref 0.7–1.3)
EOSINOPHIL NFR BLD: 2.5 % (ref 0–3)
ERYTHROCYTE [DISTWIDTH] IN BLOOD BY AUTOMATED COUNT: 16.6 % (ref 10.5–14.5)
GLUCOSE SERPL-MCNC: 133 MG/DL (ref 74–106)
GRANULOCYTES NFR BLD MANUAL: 68.4 % (ref 36–66)
HCT VFR BLD CALC: 39.2 % (ref 42–52)
HGB BLD-MCNC: 12.5 GM/DL (ref 14–18)
LYMPHOCYTES NFR BLD AUTO: 20.4 % (ref 24–44)
MCH RBC QN AUTO: 24.6 PG (ref 26–34)
MCHC RBC AUTO-ENTMCNC: 31.9 G/DL (ref 28–37)
MCV RBC: 77.1 FL (ref 80–100)
MONOCYTES NFR BLD: 7.2 % (ref 1–8)
NEUTROPHILS # BLD: 9.3 THOU/UL (ref 1.4–8.2)
PLATELET # BLD: 228 THOU/UL (ref 150–400)
POTASSIUM SERPL-SCNC: 3.9 MMOL/L (ref 3.5–5.1)
PROT SERPL-MCNC: 6.9 G/DL (ref 6.4–8.2)
RBC # BLD AUTO: 5.08 MIL/UL (ref 4.5–6)
SODIUM SERPL-SCNC: 141 MMOL/L (ref 136–145)
TROPONIN I SERPL-MCNC: <0.06 NG/ML (ref ?–0.06)
WBC # BLD AUTO: 13.6 THOU/UL (ref 4–11)

## 2021-01-07 NOTE — EKG
[place] [address] [address]
[place]  [address]
Phone:  [phone]                    ELECTROCARDIOGRAM REPORT      
_______________________________________________________________________________
 
Name:       MINESH TALBOT               Room #:                     PRE [facility]..#:      [id number]     Account #:      [id number]  
Admission:              Attend Phys:                          
Discharge:              Date of Birth:  [birth year]  
                                                          Report #: [id number]
   [id number]
_______________________________________________________________________________
                         [facility] ED
                                       
Test Date:    2021               Test Time:    13:42:49
Pat Name:     MINESH TALBOT            Department:   
Patient ID:   [id number]            Room:          
Gender:                               Technician:   bharat
:          1980               Requested By: Antonio Goff
Order Number: [id number] MD:   Teddy Billingsley
                                 Measurements
Intervals                              Axis          
Rate:         77                       P:            25
OH:           130                      QRS:          4
QRSD:         91                       T:            -10
QT:           365                                    
QTc:          414                                    
                           Interpretive Statements
Sinus rhythm
Left ventricular hypertrophy
Borderline T abnormalities, inferior leads
Compared to ECG 10/08/2020 15:47:39
Left ventricular hypertrophy now present
T-wave abnormality now present
Electronically Signed On 2021 14:09:46 CST by Teddy Billingsley
[email or web address]
 
 
 
 
 
 
 
 
 
 
 
 
 
 
 
 
 
 
 
  <ELECTRONICALLY SIGNED>
   By: Teddy Billingsley MD, [facility]    
  21     1409
D: 21 1342                           _____________________________________
T: 21 1342                           Teddy Billingsley MD, FACC      /EPI

## 2021-01-13 ENCOUNTER — HOSPITAL ENCOUNTER (EMERGENCY)
Dept: HOSPITAL 35 - ER | Age: 41
Discharge: HOME | End: 2021-01-13
Payer: COMMERCIAL

## 2021-01-13 VITALS — HEIGHT: 63 IN | WEIGHT: 202.01 LBS | BODY MASS INDEX: 35.79 KG/M2

## 2021-01-13 VITALS — DIASTOLIC BLOOD PRESSURE: 89 MMHG | SYSTOLIC BLOOD PRESSURE: 161 MMHG

## 2021-01-13 DIAGNOSIS — Z98.890: ICD-10-CM

## 2021-01-13 DIAGNOSIS — E11.42: ICD-10-CM

## 2021-01-13 DIAGNOSIS — N39.0: Primary | ICD-10-CM

## 2021-01-13 DIAGNOSIS — Z86.73: ICD-10-CM

## 2021-01-13 DIAGNOSIS — Z88.0: ICD-10-CM

## 2021-01-13 DIAGNOSIS — Z79.899: ICD-10-CM

## 2021-01-13 DIAGNOSIS — F17.210: ICD-10-CM

## 2021-01-13 DIAGNOSIS — E78.5: ICD-10-CM

## 2021-01-13 DIAGNOSIS — Z88.6: ICD-10-CM

## 2021-01-13 DIAGNOSIS — F12.90: ICD-10-CM

## 2021-01-13 DIAGNOSIS — R39.89: ICD-10-CM

## 2021-01-13 DIAGNOSIS — Z88.5: ICD-10-CM

## 2021-01-13 LAB
BACTERIA-REFLEX: (no result) /HPF
BILIRUB UR-MCNC: NEGATIVE MG/DL
COLOR UR: YELLOW
KETONES UR STRIP-MCNC: NEGATIVE MG/DL
RBC # UR STRIP: (no result) /UL
RBC #/AREA URNS HPF: (no result) /HPF (ref 0–2)
SP GR UR STRIP: 1.02 (ref 1–1.03)
SQUAMOUS: (no result) /LPF (ref 0–3)
URINE CLARITY: (no result)
URINE GLUCOSE-RANDOM*: (no result)
URINE LEUKOCYTES-REFLEX: (no result)
URINE NITRITE-REFLEX: NEGATIVE
URINE PROTEIN (DIPSTICK): (no result)
URINE WBC-REFLEX: (no result) /HPF (ref 0–5)
UROBILINOGEN UR STRIP-ACNC: 0.2 E.U./DL (ref 0.2–1)

## 2021-11-19 ENCOUNTER — HOSPITAL ENCOUNTER (OUTPATIENT)
Dept: HOSPITAL 35 - OR | Age: 41
Discharge: HOME | End: 2021-11-19
Attending: ORTHOPAEDIC SURGERY
Payer: COMMERCIAL

## 2021-11-19 VITALS — HEIGHT: 62.99 IN | BODY MASS INDEX: 36.32 KG/M2 | WEIGHT: 205 LBS

## 2021-11-19 VITALS — SYSTOLIC BLOOD PRESSURE: 136 MMHG | DIASTOLIC BLOOD PRESSURE: 96 MMHG

## 2021-11-19 DIAGNOSIS — I10: ICD-10-CM

## 2021-11-19 DIAGNOSIS — M19.012: ICD-10-CM

## 2021-11-19 DIAGNOSIS — S43.492A: ICD-10-CM

## 2021-11-19 DIAGNOSIS — Z20.822: ICD-10-CM
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
## 2021-11-19 DIAGNOSIS — E11.40: ICD-10-CM

## 2021-11-19 DIAGNOSIS — M75.102: ICD-10-CM

## 2021-11-19 DIAGNOSIS — M25.512: Primary | ICD-10-CM

## 2021-11-19 DIAGNOSIS — E78.5: ICD-10-CM
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
## 2021-11-19 DIAGNOSIS — F17.210: ICD-10-CM
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
## 2021-11-19 DIAGNOSIS — Z98.890: ICD-10-CM

## 2021-11-19 DIAGNOSIS — M75.52: ICD-10-CM

## 2021-11-19 DIAGNOSIS — Z79.899: ICD-10-CM

## 2021-11-19 DIAGNOSIS — M65.812: ICD-10-CM

## 2021-11-19 PROCEDURE — 50403: CPT

## 2021-11-19 PROCEDURE — 50010 RENAL EXPLORATION: CPT

## 2021-11-19 PROCEDURE — 50386 REMOVE STENT VIA TRANSURETH: CPT

## 2021-11-19 PROCEDURE — 56527: CPT

## 2021-11-19 PROCEDURE — 52313: CPT

## 2021-11-19 PROCEDURE — 62900: CPT

## 2021-11-19 PROCEDURE — 50935: CPT

## 2021-11-19 PROCEDURE — 58577: CPT

## 2021-11-19 PROCEDURE — 50597: CPT

## 2021-11-19 PROCEDURE — 58589: CPT

## 2021-11-19 PROCEDURE — 64039: CPT

## 2021-11-19 PROCEDURE — 58576: CPT

## 2021-11-19 PROCEDURE — 50172: CPT

## 2021-11-19 PROCEDURE — 53610: CPT

## 2021-11-19 PROCEDURE — 51847: CPT

## 2021-11-19 PROCEDURE — 57419: CPT

## 2021-11-19 PROCEDURE — 70005: CPT

## 2021-11-19 PROCEDURE — 62110: CPT

## 2021-11-19 PROCEDURE — 52001 CYSTO W/IRRG&EVAC MLT CLOTS: CPT

## 2021-11-19 PROCEDURE — 57103: CPT

## 2021-11-19 PROCEDURE — 50101: CPT

## 2022-01-31 ENCOUNTER — HOSPITAL ENCOUNTER (OUTPATIENT)
Dept: HOSPITAL 35 - OR | Age: 42
Discharge: HOME | End: 2022-01-31
Attending: ORTHOPAEDIC SURGERY
Payer: COMMERCIAL

## 2022-01-31 VITALS — HEIGHT: 64 IN | WEIGHT: 205 LBS | BODY MASS INDEX: 35 KG/M2

## 2022-01-31 VITALS — SYSTOLIC BLOOD PRESSURE: 158 MMHG | DIASTOLIC BLOOD PRESSURE: 93 MMHG

## 2022-01-31 DIAGNOSIS — M65.332: ICD-10-CM

## 2022-01-31 DIAGNOSIS — Z86.73: ICD-10-CM

## 2022-01-31 DIAGNOSIS — Z98.890: ICD-10-CM

## 2022-01-31 DIAGNOSIS — M77.12: ICD-10-CM

## 2022-01-31 DIAGNOSIS — J45.909: ICD-10-CM

## 2022-01-31 DIAGNOSIS — F17.210: ICD-10-CM

## 2022-01-31 DIAGNOSIS — M19.90: ICD-10-CM

## 2022-01-31 DIAGNOSIS — G56.02: Primary | ICD-10-CM

## 2022-01-31 DIAGNOSIS — E78.5: ICD-10-CM

## 2022-01-31 DIAGNOSIS — Z20.822: ICD-10-CM

## 2022-01-31 DIAGNOSIS — Z90.49: ICD-10-CM

## 2022-01-31 DIAGNOSIS — Z79.899: ICD-10-CM

## 2022-01-31 DIAGNOSIS — K21.9: ICD-10-CM

## 2022-01-31 DIAGNOSIS — Z88.0: ICD-10-CM

## 2022-01-31 DIAGNOSIS — Z98.52: ICD-10-CM

## 2022-01-31 DIAGNOSIS — I12.9: ICD-10-CM

## 2022-01-31 DIAGNOSIS — Z88.8: ICD-10-CM

## 2022-01-31 DIAGNOSIS — N18.9: ICD-10-CM

## 2022-01-31 DIAGNOSIS — E11.22: ICD-10-CM

## 2022-01-31 LAB
ALBUMIN SERPL-MCNC: 3.4 G/DL (ref 3.4–5)
ALT SERPL-CCNC: 20 U/L (ref 16–63)
ANION GAP SERPL CALC-SCNC: 9 MMOL/L (ref 7–16)
AST SERPL-CCNC: 10 U/L (ref 15–37)
BILIRUB SERPL-MCNC: 0.1 MG/DL (ref 0.2–1)
BUN SERPL-MCNC: 18 MG/DL (ref 7–18)
CALCIUM SERPL-MCNC: 8.5 MG/DL (ref 8.5–10.1)
CHLORIDE SERPL-SCNC: 107 MMOL/L (ref 98–107)
CO2 SERPL-SCNC: 28 MMOL/L (ref 21–32)
CREAT SERPL-MCNC: 1.8 MG/DL (ref 0.7–1.3)
GLUCOSE SERPL-MCNC: 237 MG/DL (ref 74–106)
POTASSIUM SERPL-SCNC: 4.3 MMOL/L (ref 3.5–5.1)
PROT SERPL-MCNC: 7 G/DL (ref 6.4–8.2)
SODIUM SERPL-SCNC: 144 MMOL/L (ref 136–145)

## 2022-01-31 PROCEDURE — 62900: CPT

## 2022-01-31 PROCEDURE — 50386 REMOVE STENT VIA TRANSURETH: CPT

## 2022-01-31 PROCEDURE — 50101: CPT

## 2022-01-31 PROCEDURE — 62110: CPT

## 2022-01-31 PROCEDURE — 56969: CPT

## 2022-01-31 PROCEDURE — 56526: CPT

## 2022-01-31 PROCEDURE — 50010 RENAL EXPLORATION: CPT

## 2022-01-31 PROCEDURE — 57091: CPT

## 2022-01-31 PROCEDURE — 70005: CPT
